# Patient Record
Sex: MALE | Race: NATIVE HAWAIIAN OR OTHER PACIFIC ISLANDER
[De-identification: names, ages, dates, MRNs, and addresses within clinical notes are randomized per-mention and may not be internally consistent; named-entity substitution may affect disease eponyms.]

---

## 2022-10-29 ENCOUNTER — HOSPITAL ENCOUNTER (EMERGENCY)
Dept: HOSPITAL 26 - MED | Age: 22
Discharge: HOME | End: 2022-10-29
Payer: COMMERCIAL

## 2022-10-29 VITALS — SYSTOLIC BLOOD PRESSURE: 127 MMHG | DIASTOLIC BLOOD PRESSURE: 72 MMHG

## 2022-10-29 VITALS — HEIGHT: 61 IN | BODY MASS INDEX: 35.12 KG/M2 | WEIGHT: 186 LBS

## 2022-10-29 DIAGNOSIS — J06.9: Primary | ICD-10-CM

## 2022-10-29 DIAGNOSIS — Z79.899: ICD-10-CM

## 2022-10-29 DIAGNOSIS — Z20.822: ICD-10-CM

## 2022-10-29 PROCEDURE — 71045 X-RAY EXAM CHEST 1 VIEW: CPT

## 2022-10-29 PROCEDURE — 87804 INFLUENZA ASSAY W/OPTIC: CPT

## 2022-10-29 PROCEDURE — 87420 RESP SYNCYTIAL VIRUS AG IA: CPT

## 2022-10-29 PROCEDURE — 87426 SARSCOV CORONAVIRUS AG IA: CPT

## 2022-10-29 PROCEDURE — 99284 EMERGENCY DEPT VISIT MOD MDM: CPT

## 2022-10-29 NOTE — NUR
Patient discharged with v/s stable. Written and verbal after care instructions 
given and explained. 

Patient alert, oriented and verbalized understanding of instructions. 
Ambulatory with steady gait. All questions addressed prior to discharge. ID 
band removed. Patient advised to follow up with PMD. Rx of benzonatate, 
guafenesin, sudafed (sent) given. Patient educated on indication of medication 
including possible reaction and side effects. Opportunity to ask questions 
provided and answered.

work note given, labs and xray copy given

## 2022-10-29 NOTE — NUR
23 y/o male bib self with wife and son, pt presents to ed with c/o cough, chest 
congestion, chest pressure at night, and diarrhea for 2 days. states wife at 
home was sick, then son and then himself. pt denies fever, chills, n&v, or sob. 
denies any contact with sick persons at work or family. 



[facility]: denies

nka

med: cough drops